# Patient Record
Sex: MALE | Race: BLACK OR AFRICAN AMERICAN | Employment: UNEMPLOYED | URBAN - METROPOLITAN AREA
[De-identification: names, ages, dates, MRNs, and addresses within clinical notes are randomized per-mention and may not be internally consistent; named-entity substitution may affect disease eponyms.]

---

## 2019-01-13 ENCOUNTER — HOSPITAL ENCOUNTER (EMERGENCY)
Facility: HOSPITAL | Age: 24
Discharge: HOME | End: 2019-01-13
Payer: MEDICAID

## 2019-01-13 NOTE — ED NOTES
Patient arrives to ED accompanied by  Nancy Vogel # [id number], from [facility].  noted above requesting DUI test at this time. Patient declines physical examination by ED Physician.      DUI specimen collec
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
